# Patient Record
Sex: FEMALE | Race: WHITE | NOT HISPANIC OR LATINO | Employment: FULL TIME | URBAN - METROPOLITAN AREA
[De-identification: names, ages, dates, MRNs, and addresses within clinical notes are randomized per-mention and may not be internally consistent; named-entity substitution may affect disease eponyms.]

---

## 2023-08-17 ENCOUNTER — HOSPITAL ENCOUNTER (EMERGENCY)
Facility: CLINIC | Age: 48
Discharge: HOME | End: 2023-08-17
Attending: EMERGENCY MEDICINE | Admitting: EMERGENCY MEDICINE
Payer: COMMERCIAL

## 2023-08-17 ENCOUNTER — APPOINTMENT (OUTPATIENT)
Dept: GENERAL RADIOLOGY | Facility: CLINIC | Age: 48
End: 2023-08-17
Attending: EMERGENCY MEDICINE
Payer: COMMERCIAL

## 2023-08-17 VITALS
HEART RATE: 58 BPM | DIASTOLIC BLOOD PRESSURE: 88 MMHG | SYSTOLIC BLOOD PRESSURE: 137 MMHG | HEIGHT: 65 IN | WEIGHT: 205 LBS | RESPIRATION RATE: 11 BRPM | TEMPERATURE: 97.8 F | OXYGEN SATURATION: 99 % | BODY MASS INDEX: 34.16 KG/M2

## 2023-08-17 DIAGNOSIS — R07.9 CHEST PAIN, UNSPECIFIED TYPE: ICD-10-CM

## 2023-08-17 DIAGNOSIS — R07.89 CHEST TIGHTNESS: ICD-10-CM

## 2023-08-17 LAB
ALBUMIN SERPL BCG-MCNC: 4.6 G/DL (ref 3.5–5.2)
ALP SERPL-CCNC: 84 U/L (ref 35–104)
ALT SERPL W P-5'-P-CCNC: 23 U/L (ref 0–50)
ANION GAP SERPL CALCULATED.3IONS-SCNC: 13 MMOL/L (ref 7–15)
AST SERPL W P-5'-P-CCNC: 23 U/L (ref 0–45)
ATRIAL RATE - MUSE: 59 BPM
BASOPHILS # BLD AUTO: 0.1 10E3/UL (ref 0–0.2)
BASOPHILS NFR BLD AUTO: 1 %
BILIRUB SERPL-MCNC: 0.3 MG/DL
BUN SERPL-MCNC: 14.1 MG/DL (ref 6–20)
CALCIUM SERPL-MCNC: 9.3 MG/DL (ref 8.6–10)
CHLORIDE SERPL-SCNC: 102 MMOL/L (ref 98–107)
CREAT SERPL-MCNC: 0.71 MG/DL (ref 0.51–0.95)
D DIMER PPP FEU-MCNC: <0.27 UG/ML FEU (ref 0–0.5)
DEPRECATED HCO3 PLAS-SCNC: 24 MMOL/L (ref 22–29)
DIASTOLIC BLOOD PRESSURE - MUSE: NORMAL MMHG
EOSINOPHIL # BLD AUTO: 0.2 10E3/UL (ref 0–0.7)
EOSINOPHIL NFR BLD AUTO: 3 %
ERYTHROCYTE [DISTWIDTH] IN BLOOD BY AUTOMATED COUNT: 13 % (ref 10–15)
GFR SERPL CREATININE-BSD FRML MDRD: >90 ML/MIN/1.73M2
GLUCOSE SERPL-MCNC: 89 MG/DL (ref 70–99)
HCT VFR BLD AUTO: 39.7 % (ref 35–47)
HGB BLD-MCNC: 12.9 G/DL (ref 11.7–15.7)
HOLD SPECIMEN: NORMAL
IMM GRANULOCYTES # BLD: 0 10E3/UL
IMM GRANULOCYTES NFR BLD: 0 %
INTERPRETATION ECG - MUSE: NORMAL
LYMPHOCYTES # BLD AUTO: 1.6 10E3/UL (ref 0.8–5.3)
LYMPHOCYTES NFR BLD AUTO: 19 %
MAGNESIUM SERPL-MCNC: 1.8 MG/DL (ref 1.7–2.3)
MCH RBC QN AUTO: 30.1 PG (ref 26.5–33)
MCHC RBC AUTO-ENTMCNC: 32.5 G/DL (ref 31.5–36.5)
MCV RBC AUTO: 93 FL (ref 78–100)
MONOCYTES # BLD AUTO: 0.5 10E3/UL (ref 0–1.3)
MONOCYTES NFR BLD AUTO: 6 %
NEUTROPHILS # BLD AUTO: 5.9 10E3/UL (ref 1.6–8.3)
NEUTROPHILS NFR BLD AUTO: 71 %
NRBC # BLD AUTO: 0 10E3/UL
NRBC BLD AUTO-RTO: 0 /100
P AXIS - MUSE: 63 DEGREES
PLATELET # BLD AUTO: 241 10E3/UL (ref 150–450)
POTASSIUM SERPL-SCNC: 4.4 MMOL/L (ref 3.4–5.3)
PR INTERVAL - MUSE: 138 MS
PROT SERPL-MCNC: 7.4 G/DL (ref 6.4–8.3)
QRS DURATION - MUSE: 84 MS
QT - MUSE: 454 MS
QTC - MUSE: 449 MS
R AXIS - MUSE: 53 DEGREES
RBC # BLD AUTO: 4.29 10E6/UL (ref 3.8–5.2)
SODIUM SERPL-SCNC: 139 MMOL/L (ref 136–145)
SYSTOLIC BLOOD PRESSURE - MUSE: NORMAL MMHG
T AXIS - MUSE: 52 DEGREES
TROPONIN T SERPL HS-MCNC: 10 NG/L
TROPONIN T SERPL HS-MCNC: 11 NG/L
TSH SERPL DL<=0.005 MIU/L-ACNC: 2.04 UIU/ML (ref 0.3–4.2)
VENTRICULAR RATE- MUSE: 59 BPM
WBC # BLD AUTO: 8.3 10E3/UL (ref 4–11)

## 2023-08-17 PROCEDURE — 83735 ASSAY OF MAGNESIUM: CPT | Performed by: EMERGENCY MEDICINE

## 2023-08-17 PROCEDURE — 36415 COLL VENOUS BLD VENIPUNCTURE: CPT | Performed by: EMERGENCY MEDICINE

## 2023-08-17 PROCEDURE — 250N000013 HC RX MED GY IP 250 OP 250 PS 637: Performed by: EMERGENCY MEDICINE

## 2023-08-17 PROCEDURE — 84443 ASSAY THYROID STIM HORMONE: CPT | Performed by: EMERGENCY MEDICINE

## 2023-08-17 PROCEDURE — 71046 X-RAY EXAM CHEST 2 VIEWS: CPT

## 2023-08-17 PROCEDURE — 84484 ASSAY OF TROPONIN QUANT: CPT | Performed by: EMERGENCY MEDICINE

## 2023-08-17 PROCEDURE — 99285 EMERGENCY DEPT VISIT HI MDM: CPT | Mod: 25

## 2023-08-17 PROCEDURE — 85025 COMPLETE CBC W/AUTO DIFF WBC: CPT | Performed by: EMERGENCY MEDICINE

## 2023-08-17 PROCEDURE — 80053 COMPREHEN METABOLIC PANEL: CPT | Performed by: EMERGENCY MEDICINE

## 2023-08-17 PROCEDURE — 93005 ELECTROCARDIOGRAM TRACING: CPT

## 2023-08-17 PROCEDURE — 85379 FIBRIN DEGRADATION QUANT: CPT | Performed by: EMERGENCY MEDICINE

## 2023-08-17 RX ORDER — ASPIRIN 81 MG/1
243 TABLET, CHEWABLE ORAL ONCE
Status: COMPLETED | OUTPATIENT
Start: 2023-08-17 | End: 2023-08-17

## 2023-08-17 RX ADMIN — ASPIRIN 81 MG 243 MG: 81 TABLET ORAL at 08:29

## 2023-08-17 ASSESSMENT — ACTIVITIES OF DAILY LIVING (ADL)
ADLS_ACUITY_SCORE: 35
ADLS_ACUITY_SCORE: 35

## 2023-08-17 NOTE — ED PROVIDER NOTES
"  History   Chief Complaint:  Chest Pain     HPI   Adelita Islas is a 47 year old female with a history of CAD with recent stent placement at outside hospital in May 2023 who presents with left-sided chest heaviness which began at 630 to 6:40 AM this morning when she was driving to work.  Patient notes some associated shortness of breath and worsening discomfort with exertion.  She states her symptoms feel very similar to when she had her NSTEMI which led her to having stents placed in May of this year.  Patient continues to be in cardiac rehab through the [facility] system.  She denies any kind of fever, sore throat, or cough.  She denies any abdominal pain nausea or vomiting.  She does endorse some diarrhea.  Denies any swelling in her lower extremities.  Denies history of blood clot.  She has been taking rosuvastatin as well as antihypertensive medications.  Patient has been on dual antiplatelet therapy with a baby aspirin a day as well as Plavix.    Independent Historian:   None - Patient Only    Medications:    No current outpatient medications on file.    Past Medical History:    No past medical history on file.  Past Surgical History:    No past surgical history on file.     Physical Exam     Patient Vitals for the past 24 hrs:   BP Temp Temp src Pulse Resp SpO2 Height Weight   08/17/23 1045 (!) 140/84 -- -- 61 21 98 % -- --   08/17/23 0925 -- -- -- 61 20 97 % -- --   08/17/23 0915 132/82 -- -- 58 23 98 % -- --   08/17/23 0905 (!) 144/99 -- -- 56 25 98 % -- --   08/17/23 0832 -- -- -- 57 27 97 % -- --   08/17/23 0831 -- -- -- 60 23 -- -- --   08/17/23 0830 (!) 143/89 -- -- 58 -- -- -- --   08/17/23 0808 -- -- -- 62 25 98 % -- --   08/17/23 0753 (!) 147/100 -- -- -- -- -- -- --   08/17/23 0743 (!) 152/90 97.8  F (36.6  C) Temporal 65 18 98 % 1.651 m (5' 5\") 93 kg (205 lb)   General: Alert, appears well-developed and well-nourished. Cooperative.     In mild " distress  HEENT:  Head:  Atraumatic  Ears:  External ears are normal  Mouth/Throat:  Oropharynx is without erythema or exudate and mucous membranes are moist.   Eyes:   Conjunctivae normal and EOM are normal. No scleral icterus.  CV:  Normal rate, regular rhythm, normal heart sounds and radial pulses are 2+ and symmetric.  No murmur.  Resp:  Breath sounds are clear bilaterally    Non-labored, no retractions or accessory muscle use  GI:  Abdomen is soft, no distension, no tenderness. No rebound or guarding.  No CVA tenderness bilaterally  MS:  Normal range of motion. No edema.    Normal strength in all 4 extremities.     Back atraumatic.    No midline cervical, thoracic, or lumbar tenderness  Skin:  Warm and dry.  No rash or lesions noted.  Neuro:   Alert. Normal strength.  GCS: 15  Psych: Normal mood and affect.    Emergency Department Course     ECG  ECG taken at 0744, ECG read at 0814  Sinus bradycardia  Low voltage QRS  Nonspecific T wave abnormality   No prior for comparison.  Rate 59 bpm. OH interval 138 ms. QRS duration 84 ms. QT/QTc 454/449 ms. P-R-T axes 63 53 52.     ECG results from 08/17/23   EKG 12-lead, tracing only     Value    Systolic Blood Pressure     Diastolic Blood Pressure     Ventricular Rate 59    Atrial Rate 59    OH Interval 138    QRS Duration 84        QTc 449    P Axis 63    R AXIS 53    T Axis 52    Interpretation ECG      Sinus bradycardia  Low voltage QRS  Nonspecific T wave abnormality  Abnormal ECG  No previous ECGs available  Confirmed by GENERATED REPORT, COMPUTER ([id number]),  Kristin Diego ([id number]) on 8/17/2023 10:49:50 AM       Imaging:  XR Chest 2 Views   Final Result   IMPRESSION: Heart is normal in size. Lungs are clear.      FRIDA ESPINOZA MD            SYSTEM ID:  [id number]      Exercise Stress Echocardiogram    (Results Pending)      Report per radiology    Laboratory:  Labs Ordered and Resulted from Time of ED Arrival to Time of ED Departure   COMPREHENSIVE METABOLIC  PANEL - Normal       Result Value    Sodium 139      Potassium 4.4      Chloride 102      Carbon Dioxide (CO2) 24      Anion Gap 13      Urea Nitrogen 14.1      Creatinine 0.71      Calcium 9.3      Glucose 89      Alkaline Phosphatase 84      AST 23      ALT 23      Protein Total 7.4      Albumin 4.6      Bilirubin Total 0.3      GFR Estimate >90     TROPONIN T, HIGH SENSITIVITY - Normal    Troponin T, High Sensitivity 10     D DIMER QUANTITATIVE - Normal    D-Dimer Quantitative <0.27     TSH WITH FREE T4 REFLEX - Normal    TSH 2.04     MAGNESIUM - Normal    Magnesium 1.8     TROPONIN T, HIGH SENSITIVITY - Normal    Troponin T, High Sensitivity 11     CBC WITH PLATELETS AND DIFFERENTIAL    WBC Count 8.3      RBC Count 4.29      Hemoglobin 12.9      Hematocrit 39.7      MCV 93      MCH 30.1      MCHC 32.5      RDW 13.0      Platelet Count 241      % Neutrophils 71      % Lymphocytes 19      % Monocytes 6      % Eosinophils 3      % Basophils 1      % Immature Granulocytes 0      NRBCs per 100 WBC 0      Absolute Neutrophils 5.9      Absolute Lymphocytes 1.6      Absolute Monocytes 0.5      Absolute Eosinophils 0.2      Absolute Basophils 0.1      Absolute Immature Granulocytes 0.0      Absolute NRBCs 0.0       Procedures     Emergency Department Course & Assessments:     Interventions:  Medications   aspirin (ASA) chewable tablet 243 mg (243 mg Oral $Given 8/17/23 0829)        Independent Interpretation (X-rays, CTs, rhythm strip):  Independently evaluated chest x-ray imaging which shows no evidence of pneumothorax or pneumonia.    Consultations/Discussion of Management or Tests:  None        Social Determinants of Health affecting care:   None    Disposition:  The patient was discharged to home.       HEART Score  Background  Calculates the overall risk of adverse event in patient's presenting with chest pain.  Based on 5 criteria (each assigned 0-2 points) including suspiciousness of history, EKG, age, risk  factors and troponin.    Data  47 year old female  does not have a problem list on file.   has no history on file for tobacco use.  family history is not on file.  No results found for: TROPI  Criteria   0-2 points for each of 5 items (maximum of 10 points):  Score 1- History moderately suspicious for coronary syndrome  Score 0- EKG Normal  Score 1- Age 45 to 65 years old  Score 2- Three or more risk factors for or history of atherosclerotic disease  Score 0- Within normal limits for troponin levels  Interpretation  Risk of adverse outcome  Heart Score: 4  Total Score 4-6- Adverse Outcome Risk 20.3% - Supports admission with standard rule-out management -serial troponins and stress testing    Impression & Plan    Medical Decision Making  Adelita Islas is a 47 year old female who presents with chest pain.  The work up in the Emergency Department is negative.  I considered a broad differential diagnosis in this patient including life-threatening etiologies such as acute coronary syndrome, myocardial infarction, pulmonary embolism, acute aortic dissection, myocarditis, pericarditis, acute valvular insufficiency amongst others.  Other causes considered for this patient included pneumonia, pneumothorax, chest wall source, pericarditis, pleurisy, esophageal spasm, etc.  No serious etiology for the chest pain were detected today during this visit.  Given the patient's recent NSTEMI at outside hospital on stent placement during that hospitalization, I was quite concerned about the patient's chest discomfort this morning.  Utilizing the heart pathway her heart score today is 4 and although troponins have been nondynamic I typically would recommend observation admission for further stress testing and cardiology consultation prior to discharge from the hospital.  Patient feels comfortable with 2 nondynamic troponin studies and resolution of her chest discomfort that she would like to discharge home at this time.  I do  feel the patient has appropriate medical decision-making capacity to make an informed decision to discharge.  I did attempt to offer urgent stress testing may be later this afternoon or first thing tomorrow in coordination with our cardio-pulmonary facility, although the patient would prefer to follow-up with [facility] as this is where she currently undergoes cardiac rehab.  I did encourage her to contact her cardiologist or cardiac rehab as soon as possible for consideration of outpatient stress testing and certainly if any of her symptoms were to return or change to return to the emergency department for repeat evaluation.  In case she is unable to contact her cardiologist I did place a order for an outpatient stress test to occur within the next 72 hours within our facility.  After all questions answered and return precautions understood, discharged home.      Diagnosis:    ICD-10-CM    1. Chest tightness  R07.89 Exercise Stress Echocardiogram      2. Chest pain, unspecified type  R07.9 Exercise Stress Echocardiogram           Discharge Medications:  New Prescriptions    No medications on file      Scribe Disclosure:  Michaelle CABRAL, am serving as a scribe at 8:32 AM on 8/17/2023 to document services personally performed by Joe Butts MD based on my observations and the provider's statements to me.    8/17/2023   Joe Butts MD White, Scott, MD  08/17/23 1200

## 2023-08-17 NOTE — ED TRIAGE NOTES
Patient has a history of MI, is on Plavix, presenting today with complaints of chest tightness that started this morning on her way to work around 06:40. Patient describes the pain  as tightness in her left upper chest. She says that she has anxiety was well.     Triage Assessment       Row Name 08/17/23 0740       Triage Assessment (Adult)    Airway WDL WDL       Respiratory WDL    Respiratory WDL WDL       Skin Circulation/Temperature WDL    Skin Circulation/Temperature WDL WDL       Cardiac WDL    Cardiac WDL X;chest pain       Chest Pain Assessment    Chest Pain Location anterior chest, left    Character tightness    Precipitating Factors activity    Associated Signs/Symptoms anxiety

## 2024-01-01 NOTE — ED NOTES
Patient called and requested that her Cardiac Echo Stress test be scheduled elsewhere outside of [facility].  After consulting with our [facility] providers, I informed the patient that our providers cannot order any testing outside of our system.  Patient was advised to call her primary care provider to place the order for her.  Patient expressed appreciation.    None